# Patient Record
Sex: FEMALE | URBAN - METROPOLITAN AREA
[De-identification: names, ages, dates, MRNs, and addresses within clinical notes are randomized per-mention and may not be internally consistent; named-entity substitution may affect disease eponyms.]

---

## 2020-03-16 ENCOUNTER — VIRTUAL VISIT (OUTPATIENT)
Dept: FAMILY MEDICINE | Facility: OTHER | Age: 40
End: 2020-03-16

## 2020-03-19 NOTE — PROGRESS NOTES
"Date: 2020 13:56:21  Clinician: Angel Benson  Clinician NPI: [id number]  Patient: Belgica Zavaleta  Patient : 1980  Patient Address: [address] [place] [address]  Patient Phone: [phone]  Visit Protocol: URI  Patient Summary:  Belgica is a 40 year old ( : 1980 ) female who initiated a Visit for COVID-19 (Coronavirus) evaluation and screening. When asked the question \"Please sign me up to receive news, health information and promotions. \", Belgica responded \"No\".    Belgica states her symptoms started suddenly 7-9 days ago. After her symptoms started, they improved and then got worse again.   Her symptoms consist of malaise, wheezing, a sore throat, and a cough. She is experiencing mild difficulty breathing with activities but can speak normally in full sentences.   Symptom details     Cough: Belgica coughs every 5-10 minutes and her cough is not more bothersome at night. Phlegm comes into her throat when she coughs. She does not believe her cough is caused by post-nasal drip. The color of the phlegm is green.     Sore throat: Belgica reports having moderate throat pain (4-6 on a 10 point pain scale), does not have exudate on her tonsils, and can swallow liquids. She is not sure if the lymph nodes in her neck are enlarged. A rash has not appeared on the skin since the sore throat started.     Wheezing: Belgica has not ever been diagnosed with asthma. The wheezing does not interfere with her normal daily activities.     Belgica denies having ear pain, headache, rhinitis, facial pain or pressure, myalgias, nasal congestion, teeth pain, fever, and chills. She also denies having recent facial or sinus surgery in the past 60 days and taking antibiotic medication for the symptoms.   Precipitating events  Within the past week, Belgica has not been exposed to someone with strep throat. She has not recently been exposed to someone with influenza. Belgica has been in close " contact with the following high risk individuals: children under the age of 5.   Pertinent COVID-19 (Coronavirus) information  Belgica has not traveled internationally or to the areas where COVID-19 (Coronavirus) is widespread in the last 14 days before the start of her symptoms.   Belgica has not had close contact with a suspected or laboratory-confirmed COVID-19 patient within 14 days of symptom onset.   Belgica is not a healthcare worker and does not work in a healthcare facility.   Pertinent medical history  Belgica typically gets a yeast infection when she takes antibiotics. She has used fluconazole (Diflucan) to treat previous yeast infections. 1 dose of fluconazole (Diflucan) has typically been sufficient for symptoms to resolve in the past.   Belgica does not need a return to work/school note.   Weight: 175 lbs   Belgica does not smoke or use smokeless tobacco.   She denies pregnancy and denies breastfeeding. She is currently menstruating.   Weight: 175 lbs    MEDICATIONS: No current medications, ALLERGIES: NKDA  Clinician Response:  Dear Belgica,   Based on the information you have provided, you do have symptoms that are consistent with Coronavirus (COVID-19).   The coronavirus causes mild to severe respiratory illness with the most common symptoms including fever, cough and difficulty breathing. Unfortunately, many viruses cause similar symptoms and it can be difficult to distinguish between viruses, especially in mild cases, so we are presuming that anyone with cough or fever has coronavirus at this time.  Coronavirus/COVID-19 has reached the point of community spread in [place], meaning that we are finding the virus in people with no known exposure risk for andrea the virus. Given the increasing commonness of coronavirus in the community we are focusing testing on those people at highest risk of developing significant complications of the disease. This includes people who are over age 60,  have Hypertension, Diabetes, Heart conditions such as heart failure or previous heart attack, end stage renal disease, chronic liver conditions, COPD or Emphysema, Asthma, Interstitial Lung Disease, Cancer, transplant recipients, HIV, individuals on chemotherapy or immunosuppressive medications.  If you believe you may have a condition that puts you at high risk that is not listed above, please contact your specialty provider to discuss if you should be tested.  Those such as yourself who are younger and healthy may not be offered testing and should assume are infected with coronavirus. Accordingly, you should self-quarantine for fourteen days from the first day your symptoms started. You should call if you find increasing shortness of breath, wheezing or sustained fever above 101.5. If you are significantly short of breath or experience chest pain you should call 911 or report to the nearest emergency department for urgent evaluation.   Isolate Yourself:    Isolate yourself at home.   Do Not allow any visitors  Do Not go to work or school  Do Not go to [place],  centers, shopping, or other public places.  Do Not shake hands.  Avoid close contact with others (hugging, kissing).Protect Others:     Cover Your Mouth and Nose with a mask, disposable tissue or wash cloth to avoid spreading germs to others.  Wash your hands and face frequently with soap and water.   If you develop significant shortness of breath that prevents you from doing normal activities, please call 911 or proceed to the nearest emergency room and alert them immediately that you have been in self-isolation for possible coronavirus.   For more information about COVID19 and options for caring for yourself at home, please visit the CDC website at https://www.cdc.gov/coronavirus/2019-ncov/about/steps-when-sick.htmlFor more options for care at [facility], please visit our website at [email or web address]      Diagnosis: Cough  Diagnosis ICD: R05